# Patient Record
Sex: FEMALE | Race: BLACK OR AFRICAN AMERICAN | NOT HISPANIC OR LATINO | Employment: FULL TIME | URBAN - METROPOLITAN AREA
[De-identification: names, ages, dates, MRNs, and addresses within clinical notes are randomized per-mention and may not be internally consistent; named-entity substitution may affect disease eponyms.]

---

## 2021-11-29 ENCOUNTER — OFFICE VISIT (OUTPATIENT)
Dept: URGENT CARE | Facility: CLINIC | Age: 31
End: 2021-11-29
Payer: COMMERCIAL

## 2021-11-29 VITALS
HEIGHT: 64 IN | HEART RATE: 103 BPM | BODY MASS INDEX: 39.95 KG/M2 | SYSTOLIC BLOOD PRESSURE: 134 MMHG | TEMPERATURE: 98 F | DIASTOLIC BLOOD PRESSURE: 93 MMHG | RESPIRATION RATE: 16 BRPM | WEIGHT: 234 LBS | OXYGEN SATURATION: 99 %

## 2021-11-29 DIAGNOSIS — R30.0 DYSURIA: ICD-10-CM

## 2021-11-29 DIAGNOSIS — R68.83 CHILLS: ICD-10-CM

## 2021-11-29 DIAGNOSIS — J10.1 INFLUENZA A: Primary | ICD-10-CM

## 2021-11-29 LAB
B-HCG UR QL: NEGATIVE
BILIRUB UR QL STRIP: NEGATIVE
CTP QC/QA: YES
GLUCOSE UR QL STRIP: NEGATIVE
KETONES UR QL STRIP: NEGATIVE
LEUKOCYTE ESTERASE UR QL STRIP: NEGATIVE
PH, POC UA: 6 (ref 5–8)
POC BLOOD, URINE: NEGATIVE
POC MOLECULAR INFLUENZA A AGN: POSITIVE
POC MOLECULAR INFLUENZA B AGN: NEGATIVE
POC NITRATES, URINE: NEGATIVE
PROT UR QL STRIP: NEGATIVE
SARS-COV-2 RDRP RESP QL NAA+PROBE: NEGATIVE
SP GR UR STRIP: 1.02 (ref 1–1.03)
UROBILINOGEN UR STRIP-ACNC: NORMAL (ref 0.1–1.1)

## 2021-11-29 PROCEDURE — 81025 POCT URINE PREGNANCY: ICD-10-PCS | Mod: S$GLB,,, | Performed by: PHYSICIAN ASSISTANT

## 2021-11-29 PROCEDURE — 99203 PR OFFICE/OUTPT VISIT, NEW, LEVL III, 30-44 MIN: ICD-10-PCS | Mod: 25,S$GLB,CS, | Performed by: PHYSICIAN ASSISTANT

## 2021-11-29 PROCEDURE — 81003 URINALYSIS AUTO W/O SCOPE: CPT | Mod: QW,S$GLB,, | Performed by: PHYSICIAN ASSISTANT

## 2021-11-29 PROCEDURE — 87502 POCT INFLUENZA A/B MOLECULAR: ICD-10-PCS | Mod: QW,S$GLB,, | Performed by: PHYSICIAN ASSISTANT

## 2021-11-29 PROCEDURE — 81025 URINE PREGNANCY TEST: CPT | Mod: S$GLB,,, | Performed by: PHYSICIAN ASSISTANT

## 2021-11-29 PROCEDURE — 87086 URINE CULTURE/COLONY COUNT: CPT | Performed by: PHYSICIAN ASSISTANT

## 2021-11-29 PROCEDURE — 99203 OFFICE O/P NEW LOW 30 MIN: CPT | Mod: 25,S$GLB,CS, | Performed by: PHYSICIAN ASSISTANT

## 2021-11-29 PROCEDURE — U0002 COVID-19 LAB TEST NON-CDC: HCPCS | Mod: QW,S$GLB,, | Performed by: PHYSICIAN ASSISTANT

## 2021-11-29 PROCEDURE — U0002: ICD-10-PCS | Mod: QW,S$GLB,, | Performed by: PHYSICIAN ASSISTANT

## 2021-11-29 PROCEDURE — 81003 POCT URINALYSIS, DIPSTICK, AUTOMATED, W/O SCOPE: ICD-10-PCS | Mod: QW,S$GLB,, | Performed by: PHYSICIAN ASSISTANT

## 2021-11-29 PROCEDURE — 87502 INFLUENZA DNA AMP PROBE: CPT | Mod: QW,S$GLB,, | Performed by: PHYSICIAN ASSISTANT

## 2021-11-30 LAB
BACTERIA UR CULT: NORMAL
BACTERIA UR CULT: NORMAL

## 2021-12-01 ENCOUNTER — TELEPHONE (OUTPATIENT)
Dept: URGENT CARE | Facility: CLINIC | Age: 31
End: 2021-12-01
Payer: COMMERCIAL

## 2022-05-02 ENCOUNTER — OFFICE VISIT (OUTPATIENT)
Dept: INTERNAL MEDICINE | Facility: CLINIC | Age: 32
End: 2022-05-02
Payer: COMMERCIAL

## 2022-05-02 DIAGNOSIS — U07.1 COVID-19: Primary | ICD-10-CM

## 2022-05-02 PROCEDURE — 1160F RVW MEDS BY RX/DR IN RCRD: CPT | Mod: CPTII,95,, | Performed by: INTERNAL MEDICINE

## 2022-05-02 PROCEDURE — 1160F PR REVIEW ALL MEDS BY PRESCRIBER/CLIN PHARMACIST DOCUMENTED: ICD-10-PCS | Mod: CPTII,95,, | Performed by: INTERNAL MEDICINE

## 2022-05-02 PROCEDURE — 99203 PR OFFICE/OUTPT VISIT, NEW, LEVL III, 30-44 MIN: ICD-10-PCS | Mod: 95,,, | Performed by: INTERNAL MEDICINE

## 2022-05-02 PROCEDURE — 1159F MED LIST DOCD IN RCRD: CPT | Mod: CPTII,95,, | Performed by: INTERNAL MEDICINE

## 2022-05-02 PROCEDURE — 1159F PR MEDICATION LIST DOCUMENTED IN MEDICAL RECORD: ICD-10-PCS | Mod: CPTII,95,, | Performed by: INTERNAL MEDICINE

## 2022-05-02 PROCEDURE — 99203 OFFICE O/P NEW LOW 30 MIN: CPT | Mod: 95,,, | Performed by: INTERNAL MEDICINE

## 2022-05-02 RX ORDER — FLUTICASONE PROPIONATE 50 MCG
1 SPRAY, SUSPENSION (ML) NASAL DAILY
Qty: 16 G | Refills: 0 | Status: SHIPPED | OUTPATIENT
Start: 2022-05-02 | End: 2022-06-28

## 2022-05-02 NOTE — PROGRESS NOTES
The patient location is:  [place]  The chief complaint leading to consultation is:  COVID-19 infection    Visit type: audiovisual    Face to Face time with patient:  13   minutes of total time spent on the encounter, which includes face to face time and non-face to face time preparing to see the patient (eg, review of tests), Obtaining and/or reviewing separately obtained history, Documenting clinical information in the electronic or other health record, Independently interpreting results (not separately reported) and communicating results to the patient/family/caregiver, or Care coordination (not separately reported).         Each patient to whom he or she provides medical services by telemedicine is:  (1) informed of the relationship between the physician and patient and the respective role of any other health care provider with respect to management of the patient; and (2) notified that he or she may decline to receive medical services by telemedicine and may withdraw from such care at any time.    Notes:   CC:  COVID-19 infection  HPI:  The patient is a 32 y.o. year old female who presents for COVID-19 infection.  Her symptoms started last Saturday.  She complains of body aches, then developed congestion.  Her symptoms worsened yesterday.  She is vaccinated and has had a booster.  She denies any fever, but reports occasional nonproductive cough and clear nasal discharge.  She denies any wheezing or shortness of breath.  The patient has taken DayQuil and NyQuil.      PAST MEDICAL HISTORY:  No past medical history on file.    SURGICAL HISTORY:  No past surgical history on file.    MEDS:  Medcard reviewed and updated    ALLERGIES: Allergy Card reviewed and updated    SOCIAL HISTORY:   The patient is a nonsmoker.    PE:   APPEARANCE: Well nourished, well developed, in no acute distress.    Video visit.  PSYCHIATRIC: The patient is oriented to person, place, and time and has a pleasant affect.         ASSESSMENT/PLAN:  Diagnoses and all orders for this visit:    COVID-19  -     patient declines paxlovid  -     recommend symptomatic treatment    Other orders  -     Discontinue: fluticasone propionate (FLONASE) 50 mcg/actuation nasal spray; 1 spray (50 mcg total) by Each Nostril route once daily.              Answers for HPI/ROS submitted by the patient on 5/2/2022  Chronicity: new  Onset: yesterday  Progression since onset: waxing and waning  Pain worse on: neither  Fever: no fever  Pain - numeric: 6/10  abdominal pain: No  congestion: Yes  cough: Yes  diarrhea: No  drooling: No  ear discharge: No  ear pain: No  headaches: Yes  hoarse voice: Yes  neck pain: No  plugged ear sensation: Yes  shortness of breath: No  stridor: No  swollen glands: No  trouble swallowing: No  vomiting: No  strep: No  mono: No  Treatments tried: acetaminophen  Improvement on treatment: mild  Pain severity: mild

## 2023-05-16 ENCOUNTER — OFFICE VISIT (OUTPATIENT)
Dept: URGENT CARE | Facility: CLINIC | Age: 33
End: 2023-05-16
Payer: COMMERCIAL

## 2023-05-16 VITALS
WEIGHT: 234 LBS | SYSTOLIC BLOOD PRESSURE: 157 MMHG | BODY MASS INDEX: 39.95 KG/M2 | RESPIRATION RATE: 18 BRPM | TEMPERATURE: 100 F | HEART RATE: 99 BPM | OXYGEN SATURATION: 99 % | DIASTOLIC BLOOD PRESSURE: 92 MMHG | HEIGHT: 64 IN

## 2023-05-16 DIAGNOSIS — R42 DIZZINESS: ICD-10-CM

## 2023-05-16 DIAGNOSIS — R60.9 MUCOSAL EDEMA: ICD-10-CM

## 2023-05-16 DIAGNOSIS — B96.89 BACTERIAL SINUSITIS: Primary | ICD-10-CM

## 2023-05-16 DIAGNOSIS — J32.9 BACTERIAL SINUSITIS: Primary | ICD-10-CM

## 2023-05-16 LAB
CTP QC/QA: YES
SARS-COV-2 AG RESP QL IA.RAPID: NEGATIVE

## 2023-05-16 PROCEDURE — 87811 SARS CORONAVIRUS 2 ANTIGEN POCT, MANUAL READ: ICD-10-PCS | Mod: QW,S$GLB,, | Performed by: NURSE PRACTITIONER

## 2023-05-16 PROCEDURE — 99213 OFFICE O/P EST LOW 20 MIN: CPT | Mod: 25,S$GLB,, | Performed by: NURSE PRACTITIONER

## 2023-05-16 PROCEDURE — 96372 PR INJECTION,THERAP/PROPH/DIAG2ST, IM OR SUBCUT: ICD-10-PCS | Mod: S$GLB,,, | Performed by: NURSE PRACTITIONER

## 2023-05-16 PROCEDURE — 99213 PR OFFICE/OUTPT VISIT, EST, LEVL III, 20-29 MIN: ICD-10-PCS | Mod: 25,S$GLB,, | Performed by: NURSE PRACTITIONER

## 2023-05-16 PROCEDURE — 96372 THER/PROPH/DIAG INJ SC/IM: CPT | Mod: S$GLB,,, | Performed by: NURSE PRACTITIONER

## 2023-05-16 PROCEDURE — 87811 SARS-COV-2 COVID19 W/OPTIC: CPT | Mod: QW,S$GLB,, | Performed by: NURSE PRACTITIONER

## 2023-05-16 RX ORDER — PANTOPRAZOLE SODIUM 40 MG/1
TABLET, DELAYED RELEASE ORAL
COMMUNITY
Start: 2022-07-16

## 2023-05-16 RX ORDER — MECLIZINE HCL 12.5 MG 12.5 MG/1
12.5 TABLET ORAL EVERY 8 HOURS PRN
Qty: 30 TABLET | Refills: 0 | Status: SHIPPED | OUTPATIENT
Start: 2023-05-16

## 2023-05-16 RX ORDER — DEXAMETHASONE SODIUM PHOSPHATE 100 MG/10ML
10 INJECTION INTRAMUSCULAR; INTRAVENOUS
Status: COMPLETED | OUTPATIENT
Start: 2023-05-16 | End: 2023-05-16

## 2023-05-16 RX ORDER — IBUPROFEN 800 MG/1
800 TABLET ORAL EVERY 6 HOURS PRN
Qty: 30 TABLET | Refills: 0 | Status: SHIPPED | OUTPATIENT
Start: 2023-05-16

## 2023-05-16 RX ORDER — CEFDINIR 300 MG/1
300 CAPSULE ORAL 2 TIMES DAILY
Qty: 20 CAPSULE | Refills: 0 | Status: SHIPPED | OUTPATIENT
Start: 2023-05-16 | End: 2023-05-26

## 2023-05-16 RX ADMIN — DEXAMETHASONE SODIUM PHOSPHATE 10 MG: 100 INJECTION INTRAMUSCULAR; INTRAVENOUS at 04:05

## 2023-05-16 NOTE — PROGRESS NOTES
"Subjective:      Patient ID: Puneet Gutiérrez is a 33 y.o. female.    Vitals:  height is 5' 4" (1.626 m) and weight is 106.1 kg (234 lb). Her oral temperature is 99.5 °F (37.5 °C). Her blood pressure is 157/92 (abnormal) and her pulse is 99. Her respiration is 18 and oxygen saturation is 99%.     Chief Complaint: DIO Jeffery is a 33 y.o. female who presents today with a chief complaint of sinus pressure and congestion, ear congestion, dizziness, sore throat, fuzzy-headed feeling, and head pressure. Symptoms started 11 days ago, and after a flight-which she realizes could be contributing to the ear pressure. But, at this time she also feels ill. Denies fever, chest pain, palpitations, sob, wheeze, n/v/d.         Sore Throat   The current episode started 1 to 4 weeks ago. Associated symptoms include congestion, diarrhea, ear pain and headaches. Pertinent negatives include no abdominal pain, coughing or shortness of breath.     Constitution: Positive for fatigue. Negative for fever.   HENT:  Positive for ear pain, congestion and sore throat.    Respiratory:  Negative for chest tightness, cough, shortness of breath and wheezing.    Gastrointestinal:  Positive for diarrhea. Negative for abdominal pain.   Neurological:  Positive for headaches.    Objective:     Physical Exam   Constitutional: She is oriented to person, place, and time. She appears well-developed. She is cooperative.  Non-toxic appearance. She does not appear ill. No distress.   HENT:   Head: Normocephalic.   Ears:   Right Ear: Hearing, tympanic membrane, external ear and ear canal normal.   Left Ear: Hearing, tympanic membrane, external ear and ear canal normal.   Nose: Congestion present. No mucosal edema, rhinorrhea or nasal deformity. No epistaxis. Right sinus exhibits no maxillary sinus tenderness and no frontal sinus tenderness. Left sinus exhibits no maxillary sinus tenderness and no frontal sinus tenderness.   Mouth/Throat: Uvula is midline, " oropharynx is clear and moist and mucous membranes are normal. Mucous membranes are moist. No trismus in the jaw. Normal dentition. No uvula swelling. No oropharyngeal exudate, posterior oropharyngeal edema or posterior oropharyngeal erythema. Oropharynx is clear.   Eyes: Lids are normal. Right eye exhibits no discharge. Left eye exhibits no discharge. No scleral icterus.   Neck: Trachea normal and phonation normal. Neck supple. No edema present. No erythema present. No neck rigidity present.   Cardiovascular: Normal rate, regular rhythm and normal heart sounds.   Pulmonary/Chest: Effort normal and breath sounds normal. No respiratory distress. She has no decreased breath sounds. She has no rhonchi.   Abdominal: Normal appearance. She exhibits no distension. Soft. There is no abdominal tenderness.   Musculoskeletal: Normal range of motion.         General: No deformity. Normal range of motion.   Neurological: She is alert and oriented to person, place, and time. She exhibits normal muscle tone. Coordination normal.   Skin: Skin is warm, dry, intact, not diaphoretic and not pale.   Psychiatric: Her speech is normal and behavior is normal. Judgment and thought content normal.   Nursing note and vitals reviewed.  Results for orders placed or performed in visit on 05/16/23   SARS Coronavirus 2 Antigen, POCT Manual Read   Result Value Ref Range    SARS Coronavirus 2 Antigen Negative Negative     Acceptable Yes       Assessment:     1. Bacterial sinusitis    2. Dizziness    3. Mucosal edema        Plan:       Bacterial sinusitis  -     SARS Coronavirus 2 Antigen, POCT Manual Read  -     cefdinir (OMNICEF) 300 MG capsule; Take 1 capsule (300 mg total) by mouth 2 (two) times daily. for 10 days  Dispense: 20 capsule; Refill: 0  -     ibuprofen (ADVIL,MOTRIN) 800 MG tablet; Take 1 tablet (800 mg total) by mouth every 6 (six) hours as needed for Pain or Temperature greater than.  Dispense: 30 tablet; Refill:  0    Dizziness  -     SARS Coronavirus 2 Antigen, POCT Manual Read  -     meclizine (ANTIVERT) 12.5 mg tablet; Take 1 tablet (12.5 mg total) by mouth every 8 (eight) hours as needed for Dizziness.  Dispense: 30 tablet; Refill: 0    Mucosal edema  -     SARS Coronavirus 2 Antigen, POCT Manual Read  -     dexAMETHasone injection 10 mg      Patient Instructions   Oral fluids  Rest  Steam (hot showers, hot tea)  Blow nose often  Avoid circulating air (such as ceiling fans) dries your airway  Avoid drinking cold drinks (worsens cough)  Therapeutic coughing to expel mucous  Sit in upright position often

## 2023-06-20 RX ORDER — FLUTICASONE PROPIONATE 50 MCG
SPRAY, SUSPENSION (ML) NASAL
Qty: 16 G | Refills: 0 | Status: SHIPPED | OUTPATIENT
Start: 2023-06-20

## 2023-06-20 NOTE — TELEPHONE ENCOUNTER
Refill Routing Note   Medication(s) are not appropriate for processing by [facility] for the following reason(s):      Responsible provider unclear    [facility] action(s):  Defer None identified     Medication Therapy Plan: NO PCP LISTED ON PROFILE; DEFERRED TO LAST PRESCRIBER OF MEDICATION      Appointments  past 12m or future 3m with PCP    Date Provider   Last Visit   5/2/2022 Erika Alcazar MD   Next Visit   Visit date not found Erika Alcazar MD   ED visits in past 90 days: 0        Note composed:8:08 AM 06/20/2023

## 2023-08-18 RX ORDER — FLUTICASONE PROPIONATE 50 MCG
SPRAY, SUSPENSION (ML) NASAL
Qty: 16 G | Refills: 0 | OUTPATIENT
Start: 2023-08-18

## 2023-08-18 NOTE — TELEPHONE ENCOUNTER
Refill Routing Note   Medication(s) are not appropriate for processing by [facility] for the following reason(s):      Responsible provider unclear    [facility] action(s):  Defer Care Due:  None identified            Appointments  past 12m or future 3m with PCP    Date Provider   Last Visit   5/2/2022 Erika Alcazar MD   Next Visit   Visit date not found Erika Alcazar MD   ED visits in past 90 days: 0        Note composed:11:47 AM 08/18/2023

## 2024-08-18 ENCOUNTER — OFFICE VISIT (OUTPATIENT)
Dept: URGENT CARE | Facility: CLINIC | Age: 34
End: 2024-08-18
Payer: COMMERCIAL

## 2024-08-18 VITALS
RESPIRATION RATE: 16 BRPM | HEIGHT: 64 IN | BODY MASS INDEX: 39.95 KG/M2 | DIASTOLIC BLOOD PRESSURE: 85 MMHG | SYSTOLIC BLOOD PRESSURE: 130 MMHG | WEIGHT: 234 LBS | HEART RATE: 91 BPM | TEMPERATURE: 99 F | OXYGEN SATURATION: 96 %

## 2024-08-18 DIAGNOSIS — R30.0 DYSURIA: Primary | ICD-10-CM

## 2024-08-18 DIAGNOSIS — R10.9 RIGHT FLANK PAIN: ICD-10-CM

## 2024-08-18 LAB
BILIRUBIN, UA POC OHS: NEGATIVE
BLOOD, UA POC OHS: ABNORMAL
CLARITY, UA POC OHS: CLEAR
COLOR, UA POC OHS: YELLOW
GLUCOSE, UA POC OHS: NEGATIVE
KETONES, UA POC OHS: NEGATIVE
LEUKOCYTES, UA POC OHS: ABNORMAL
NITRITE, UA POC OHS: NEGATIVE
PH, UA POC OHS: 7
PROTEIN, UA POC OHS: 30
SPECIFIC GRAVITY, UA POC OHS: 1.02
UROBILINOGEN, UA POC OHS: 2

## 2024-08-18 PROCEDURE — 99213 OFFICE O/P EST LOW 20 MIN: CPT | Mod: 25,S$GLB,, | Performed by: FAMILY MEDICINE

## 2024-08-18 PROCEDURE — 87086 URINE CULTURE/COLONY COUNT: CPT | Performed by: FAMILY MEDICINE

## 2024-08-18 PROCEDURE — 81003 URINALYSIS AUTO W/O SCOPE: CPT | Mod: QW,S$GLB,, | Performed by: FAMILY MEDICINE

## 2024-08-18 PROCEDURE — 96372 THER/PROPH/DIAG INJ SC/IM: CPT | Mod: S$GLB,,, | Performed by: FAMILY MEDICINE

## 2024-08-18 RX ORDER — KETOROLAC TROMETHAMINE 30 MG/ML
30 INJECTION, SOLUTION INTRAMUSCULAR; INTRAVENOUS
Status: COMPLETED | OUTPATIENT
Start: 2024-08-18 | End: 2024-08-18

## 2024-08-18 RX ORDER — AMLODIPINE AND VALSARTAN 5; 160 MG/1; MG/1
1 TABLET ORAL DAILY
COMMUNITY
Start: 2024-06-24 | End: 2025-06-24

## 2024-08-18 RX ORDER — CIPROFLOXACIN 500 MG/1
500 TABLET ORAL 2 TIMES DAILY
Qty: 14 TABLET | Refills: 0 | Status: SHIPPED | OUTPATIENT
Start: 2024-08-18 | End: 2024-08-25

## 2024-08-18 RX ORDER — TRAZODONE HYDROCHLORIDE 50 MG/1
1 TABLET ORAL NIGHTLY
COMMUNITY
Start: 2024-06-24 | End: 2025-06-24

## 2024-08-18 RX ORDER — NORETHINDRONE 5 MG/1
1 TABLET ORAL DAILY
COMMUNITY
Start: 2024-03-19 | End: 2025-03-19

## 2024-08-18 RX ORDER — IBUPROFEN 600 MG/1
600 TABLET ORAL EVERY 8 HOURS PRN
Qty: 30 TABLET | Refills: 0 | Status: SHIPPED | OUTPATIENT
Start: 2024-08-18

## 2024-08-18 RX ADMIN — KETOROLAC TROMETHAMINE 30 MG: 30 INJECTION, SOLUTION INTRAMUSCULAR; INTRAVENOUS at 10:08

## 2024-08-18 NOTE — PROGRESS NOTES
"Subjective:      Patient ID: Puneet Gutiérrez is a 34 y.o. female.    Vitals:  height is 5' 4" (1.626 m) and weight is 106.1 kg (234 lb). Her temperature is 98.5 °F (36.9 °C). Her blood pressure is 130/85 and her pulse is 91. Her respiration is 16 and oxygen saturation is 96%.     Chief Complaint: Back Pain    This is a 34 y.o. female who presents today with a chief complaint of dysuria onset 1 1/2 weeks. Pt states she is also having spotting of blood and feels like a pulled muscle in back.. Pt suspects kidney stones, has a PMHx of kidney stones 2x. Pt took ibruprufen, provided relief.    Dysuria   This is a new problem. The current episode started 1 to 4 weeks ago. The problem occurs intermittently. The problem has been unchanged. The quality of the pain is described as burning, shooting and aching. The pain is at a severity of 4/10. The pain is moderate. There has been no fever. She is Not sexually active (not sexually active in last 2 weeks). Associated symptoms include frequency and hematuria. Pertinent negatives include no discharge, nausea, possible pregnancy or constipation. She has tried NSAIDs for the symptoms. The treatment provided moderate relief. Her past medical history is significant for hypertension and kidney stones. There is no history of recurrent UTIs.     Gastrointestinal:  Negative for nausea and constipation.   Genitourinary:  Positive for dysuria, frequency and hematuria.      Objective:     Physical Exam   Constitutional: She does not appear ill. No distress. obesity  HENT:   Head: Normocephalic and atraumatic.   Mouth/Throat: Mucous membranes are moist.   Cardiovascular: Normal rate, regular rhythm, normal heart sounds and normal pulses.   Pulmonary/Chest: Effort normal and breath sounds normal.   Abdominal: Normal appearance and bowel sounds are normal. Soft. There is no left CVA tenderness and no right CVA tenderness.   Neurological: She is alert.   Nursing note and vitals " reviewed.  Results for orders placed or performed in visit on 08/18/24   POCT Urinalysis(Instrument)   Result Value Ref Range    Color, POC UA Yellow Yellow, Straw, Colorless    Clarity, POC UA Clear Clear    Glucose, POC UA Negative Negative    Bilirubin, POC UA Negative Negative    Ketones, POC UA Negative Negative    Spec Grav POC UA 1.025 1.005 - 1.030    Blood, POC UA Small (A) Negative    pH, POC UA 7.0 5.0 - 8.0    Protein, POC UA 30 (A) Negative    Urobilinogen, POC UA 2.0 (A) <=1.0    Nitrite, POC UA Negative Negative    WBC, POC UA Large (A) Negative      Assessment:     1. Dysuria    2. Right flank pain      Reviewed chart material - last imaging revealed probable passage of renal stone. Symptoms are consistent with possible UTI/early pyelonephritis. Will treats as UTI pending culture results  Plan:       Dysuria  -     POCT Urinalysis(Instrument)  -     CULTURE, URINE  -     ciprofloxacin HCl (CIPRO) 500 MG tablet; Take 1 tablet (500 mg total) by mouth 2 (two) times daily. for 7 days  Dispense: 14 tablet; Refill: 0    Right flank pain  -     ketorolac injection 30 mg  -     ibuprofen (ADVIL,MOTRIN) 600 MG tablet; Take 1 tablet (600 mg total) by mouth every 8 (eight) hours as needed.  Dispense: 30 tablet; Refill: 0    RTC prn worsening symptoms

## 2024-08-19 LAB
BACTERIA UR CULT: NORMAL
BACTERIA UR CULT: NORMAL

## 2024-10-28 ENCOUNTER — PATIENT MESSAGE (OUTPATIENT)
Dept: URGENT CARE | Facility: CLINIC | Age: 34
End: 2024-10-28

## 2024-10-28 ENCOUNTER — OFFICE VISIT (OUTPATIENT)
Dept: URGENT CARE | Facility: CLINIC | Age: 34
End: 2024-10-28
Payer: COMMERCIAL

## 2024-10-28 ENCOUNTER — HOSPITAL ENCOUNTER (OUTPATIENT)
Dept: RADIOLOGY | Facility: HOSPITAL | Age: 34
Discharge: HOME | End: 2024-10-28
Attending: NURSE PRACTITIONER
Payer: COMMERCIAL

## 2024-10-28 VITALS
OXYGEN SATURATION: 99 % | SYSTOLIC BLOOD PRESSURE: 127 MMHG | TEMPERATURE: 99 F | HEIGHT: 64 IN | RESPIRATION RATE: 16 BRPM | BODY MASS INDEX: 37.9 KG/M2 | DIASTOLIC BLOOD PRESSURE: 83 MMHG | WEIGHT: 222 LBS | HEART RATE: 88 BPM

## 2024-10-28 DIAGNOSIS — S93.402A SPRAIN OF LEFT ANKLE, UNSPECIFIED LIGAMENT, INITIAL ENCOUNTER: Primary | ICD-10-CM

## 2024-10-28 DIAGNOSIS — M25.572 ACUTE LEFT ANKLE PAIN: ICD-10-CM

## 2024-10-28 PROCEDURE — 73610 X-RAY EXAM OF ANKLE: CPT | Mod: 26,LT,, | Performed by: RADIOLOGY

## 2024-10-28 PROCEDURE — 73610 X-RAY EXAM OF ANKLE: CPT | Mod: TC,LT

## 2024-10-28 PROCEDURE — 99213 OFFICE O/P EST LOW 20 MIN: CPT | Mod: S$GLB,,, | Performed by: NURSE PRACTITIONER

## 2024-10-28 RX ORDER — MELOXICAM 7.5 MG/1
7.5 TABLET ORAL DAILY
Qty: 14 TABLET | Refills: 0 | Status: SHIPPED | OUTPATIENT
Start: 2024-10-28 | End: 2024-11-11

## 2024-11-13 ENCOUNTER — OFFICE VISIT (OUTPATIENT)
Dept: PODIATRY | Facility: CLINIC | Age: 34
End: 2024-11-13
Payer: COMMERCIAL

## 2024-11-13 VITALS
DIASTOLIC BLOOD PRESSURE: 81 MMHG | WEIGHT: 222 LBS | SYSTOLIC BLOOD PRESSURE: 133 MMHG | BODY MASS INDEX: 37.9 KG/M2 | HEIGHT: 64 IN | HEART RATE: 92 BPM

## 2024-11-13 DIAGNOSIS — M25.572 LEFT ANKLE PAIN, UNSPECIFIED CHRONICITY: Primary | ICD-10-CM

## 2024-11-13 DIAGNOSIS — S96.912A ANKLE STRAIN, LEFT, INITIAL ENCOUNTER: ICD-10-CM

## 2024-11-13 PROCEDURE — 3079F DIAST BP 80-89 MM HG: CPT | Mod: CPTII,S$GLB,, | Performed by: PODIATRIST

## 2024-11-13 PROCEDURE — 4010F ACE/ARB THERAPY RXD/TAKEN: CPT | Mod: CPTII,S$GLB,, | Performed by: PODIATRIST

## 2024-11-13 PROCEDURE — 1159F MED LIST DOCD IN RCRD: CPT | Mod: CPTII,S$GLB,, | Performed by: PODIATRIST

## 2024-11-13 PROCEDURE — 3075F SYST BP GE 130 - 139MM HG: CPT | Mod: CPTII,S$GLB,, | Performed by: PODIATRIST

## 2024-11-13 PROCEDURE — 99999 PR PBB SHADOW E&M-EST. PATIENT-LVL IV: CPT | Mod: PBBFAC,,, | Performed by: PODIATRIST

## 2024-11-13 PROCEDURE — 99203 OFFICE O/P NEW LOW 30 MIN: CPT | Mod: S$GLB,,, | Performed by: PODIATRIST

## 2024-11-13 PROCEDURE — 3008F BODY MASS INDEX DOCD: CPT | Mod: CPTII,S$GLB,, | Performed by: PODIATRIST

## 2024-11-13 PROCEDURE — 1160F RVW MEDS BY RX/DR IN RCRD: CPT | Mod: CPTII,S$GLB,, | Performed by: PODIATRIST

## 2024-11-13 RX ORDER — METHYLPREDNISOLONE 4 MG/1
TABLET ORAL
Qty: 21 TABLET | Refills: 0 | Status: SHIPPED | OUTPATIENT
Start: 2024-11-13

## 2024-11-13 NOTE — PATIENT INSTRUCTIONS
Over the counter pain creams: Voltaren Gel, Biofreeze, Bengay, tiger balm, two old goat, lidocaine gel,  Absorbine Veterinary Liniment Gel Topical Analgesic Sore Muscle and Joint Pain Relief    Recommend lotions: eucerin, eucerin for diabetics, aquaphor, A&D ointment, gold bond for diabetics, sween, [place]'s Bees all purpose baby ointment,  urea 40 with aloe or SkinIntegra rapid crack repair (found on amazon.com)    Shoe recommendations: (try 6pm.com, zappos.com , nordstromrack.Mingly, or shoes.com for discounted prices) you can visit varsity shoes in [place], DSW shoes in [place]  or carrillo rack in the [facility] (there are also several shoe brand outlets in the [facility])    ONLY purchase stability style tennis shoes AVOID flex, foam, free, yoga mat style shoes or shoes that claim to feel like clouds  If you have a flatter foot purchase shoes for PRONATION  If you have a high arch purchase shoes for SUPINATION    Shoe examples:    Asics (GT or gel foundations, gel-kayano-30), new balance stability type shoes (such as the 940 series or the [id number]), edwar (Guide 16, stabil c3),  Garnett (GTS or Beast or   transcend), propet, HokaOne (mustapha 7, arahi, blank) Simón (tennis shoes and boots)    Sofft Brand (women) Hugo&Alvin (men), clarks, crocs, aerosoles, naturalizers, SAS, ecco, born, deya herman, rockports (dress shoes)    Vionic, burkenstocks, fitflops, propet, taos, baretraps, Hoka or vionic recovery slides/sandals (sandals)    Hoka or vionic recovery slides/sandals, crocs, propet (house shoes)    Understanding Ankle Sprain    The ankle is the joint where the leg and foot meet. Bones are held in place by connective tissue called ligaments. When ankle ligaments are stretched to the point of pain and injury, it is called an ankle sprain. A sprain can tear the ligaments. These tears can be very small but still cause pain. Ankle sprains can be mild or severe.  What causes an ankle sprain?  A sprain may  occur when you twist your ankle or bend it too far. This can happen when you stumble or fall. Things that can make an ankle sprain more likely include:  Having had an ankle sprain before  Playing sports that involve running and jumping. Or playing contact sports such as football or hockey.  Wearing shoes that dont support your feet and ankles well  Having ankles with poor strength and flexibility  Symptoms of an ankle sprain  Symptoms may include:  Pain or soreness in the ankle  Swelling  Redness or bruising  Not being able to walk or put weight on the affected foot  Reduced range of motion in the ankle  A popping or tearing feeling at the time the sprain occurs  An abnormal or dislocated look to the ankle  Instability or too much range of motion in the ankle  Treatment for an ankle sprain  Treatment focuses on reducing pain and swelling, and avoiding further injury. Treatments may include:  Resting the ankle. Avoid putting weight on it. This may mean using crutches until the sprain heals.  Prescription or over-the-counter pain medicines. These help reduce swelling and pain.  Cold packs. These help reduce pain and swelling.  Raising your ankle above your heart. This helps reduce swelling.  Wrapping the ankle with an elastic bandage or ankle brace. This helps reduce swelling and gives some support to the ankle. In rare cases, you may need a cast or boot.  Stretching and other exercises. These improve flexibility and strength.  Heat packs. These may be recommended before doing ankle exercises.  Possible complications of an ankle sprain  An ankle that has been weakened by a sprain can be more likely to have repeated sprains afterward. Doing exercises to strengthen your ankle and improve balance can reduce your risk for repeated sprains. Other possible complications are long-term (chronic) pain or an ankle that remains unstable.  When to call your healthcare provider  Call your healthcare provider right away if you  have any of these:  Fever of 100.4°F (38°C) or higher, or as directed  Pain, numbness, discoloration, or coldness in the foot or toes  Pain that gets worse  Symptoms that dont get better, or get worse  New symptoms   Date Last Reviewed: 3/10/2016  © 4353-1960 [employer]. [address] [place] [address], [place] [address]. All rights reserved. This information is not intended as a substitute for professional medical care. Always follow your healthcare professional's instructions.        Treating Ankle Sprains  Treatment will depend on how bad your sprain is. For a severe sprain, healing may take 3 months or more.  Right after your injury: Use R.I.C.E.  Rest: At first, keep weight off the ankle as much as you can. You may be given crutches to help you walk without putting weight on the ankle.  Ice: Put an ice pack on the ankle for 15 minutes. Remove the pack and wait at least 30 minutes. Repeat for up to 3 days. This helps reduce swelling.  Compression: To reduce swelling and keep the joint stable, you may need to wrap the ankle with an elastic bandage. For more severe sprains, you may need an ankle brace or a cast.  Elevation: To reduce swelling, keep your ankle raised above your heart when you sit or lie down.  Medicine  Your healthcare provider may suggest oral non-steroidal anti-inflammatory medicine (NSAIDs), such as ibuprofen. This relieves the pain and helps reduce any swelling. Be sure to take your medicine as directed.  Contrast baths  After 3 days, soak your ankle in warm water for 30 seconds, then in cool water for 30 seconds. Go back and forth for 5 minutes. Doing this every 2 hours will help keep the swelling down.  Exercises    After about 2 to 3 weeks, you may be given exercises to strengthen the ligaments and muscles in the ankle. Doing these exercises will help prevent another ankle sprain. Exercises may include standing on your toes and then on your heels and doing ankle curls.  Ankle  curls  Sit on the edge of a sturdy table or lie on your back.  Pull your toes toward you. Then point them away from you. Repeat for 2 to 3 minutes.   Date Last Reviewed: 9/28/2015 © 2000-2017 [employer]. [address] [place] [id number]. All rights reserved. This information is not intended as a substitute for professional medical care. Always follow your healthcare professional's instructions.          Wearing Proper Shoes                    You walk on your feet every day, forcing them to support the weight of your body. Repeated stress on your feet can cause damage over time. The right shoes can help protect your feet. The wrong shoes can cause more foot problems. Read the information below to help you find a shoe that fits your foot needs.      A good shoe fit will cover your foot outline. A shoe that doesnt cover the outline is a bad fit.   Whats your foot shape?  To get a good fit, you need to know the shape of your foot. Do this simple test: While standing, place your foot on a piece of paper and trace around it. Is your foot straight or curved? Do you have a foot problem, such as a bunion, that causes your foot outline to show a bulge on the side of your big toe?  Finding your fit  Bring your foot outline to the shoe store to help you find the right shoe. Place a shoe you like on top of the outline to see if it matches the shape. The shoe should cover the outline. (If you have a bunion, the shoe may not cover the bulge on the outline. Look for soft leather shoes to stretch over the bunion.) Once youve found a pair of proper shoes, put them on. Walk around. Be sure the shoes dont rub or pinch. If the shoes feel good, youve found your fit!  The right shoe for you  A good shoe has features that provide comfort and support. It must also be the right size and shape for your feet. Look for a shoe made of breathable fabric and lining, such as leather or canvas. Be sure that shoes have  enough tread to prevent slipping. Go to a good shoe store for help finding the right shoe.  Good shoe features  An ideal shoe has the following:  Laces for support. If tying laces is a problem for you, try shoes with Velcro fasteners or arturo.  A front of the shoe (toe box) with ½ inch space in front of your longest toes.  An arch shape that supports your foot.  No more than 1½ inches of heel.  A stiff, snug back of the shoe to keep your foot from sliding around.  A smooth lining with no rough seams.  Shoe shopping tips  Below are some dos and donts for when you go to the shoe store.  Do:  Select the shoes that feel right. Wear them around the house. Then bring them to your foot healthcare provider to check for fit. If they dont fit well, return them.  Shop late in the day, when your feet will be slightly bigger.  Each time you buy shoes, have both your feet measured while you are standing. Foot size changes with time.  Pick shoes to suit their purpose. High heels are OK for an occasional night on the town. But for everyday wear, choose a more sensible shoe.  Try on shoes while wearing any inserts specially made for your feet (orthoses).  Try on both the right and left shoes. If your feet are different sizes, pick a pair that fits the larger foot.  Dont:  Dont buy shoes based on shoe size alone. Always try on shoes, as sizes differ from brand to brand and within brands.  Dont expect shoes to break in. If they dont fit at the store, dont buy them.  Dont buy a shoe that doesnt match your foot shape.  What about socks?  Always wear socks with shoes. Socks help absorb sweat and reduce friction and blistering. When shopping for shoes, choose soft, padded socks with seams that dont irritate your feet.  If you have foot problems  Some foot problems cause deformities. This can make it hard to find a good fit. Look for shoes made of soft leather to stretch over the deformity. If you have bunions, buy shoes  with a wider toe box. To fit hammertoes, look for shoes with a tall toe box. If you have arch problems, you may need inserts. In some cases, youll need to have custom footwear or orthoses made for your feet.  Suggested footwear  Ask your healthcare provider what kind of footwear you need. He or she may recommend a certain brand or shoe store.  Date Last Reviewed: 8/1/2016  © 2989-5256 [employer]. [address] [place] [address], [place] [address]. All rights reserved. This information is not intended as a substitute for professional medical care. Always follow your healthcare professional's instructions.

## 2024-11-13 NOTE — PROGRESS NOTES
Subjective:      Patient ID: Puneet Gutiérrez is a 34 y.o. female.    Chief Complaint: Ankle Injury and Ankle Pain (left)      Puneet Gutiérrez is a 34 y.o. female who presents to the podiatry clinic  with complaint of  left foot pain, especially with palpation ambulation. Description: moderate Nature: aching, sharp, and throbbing Location:  Medial ankle. Onset of the symptoms was September 20, 2024. Precipitating event: none known.  Patient has been working out avidly since last year.  History of injury: no Current symptoms include: ability to bear weight, but with some pain, stiffness, swelling, and worsening symptoms after a period of activity. Alleviating factors: rest Symptoms have progressed to a point and plateaued.  She has been treating with rest ice elevation, 600 mg ibuprofen twice a day on occasion.  Patients rates pain 2/10 on pain scale.    Shoe gear: flexible Nikes today, wears jackson to gym      There is no problem list on file for this patient.      Current Outpatient Medications on File Prior to Visit   Medication Sig Dispense Refill    amlodipine-valsartan (EXFORGE) 5-160 mg per tablet Take 1 tablet by mouth once daily.      fluticasone propionate (FLONASE) 50 mcg/actuation nasal spray SHAKE LIQUID AND USE 1 SPRAY(50 MCG) IN EACH NOSTRIL EVERY DAY 16 g 0    ibuprofen (ADVIL,MOTRIN) 600 MG tablet Take 1 tablet (600 mg total) by mouth every 8 (eight) hours as needed. 30 tablet 0    ibuprofen (ADVIL,MOTRIN) 800 MG tablet Take 1 tablet (800 mg total) by mouth every 6 (six) hours as needed for Pain or Temperature greater than. 30 tablet 0    meclizine (ANTIVERT) 12.5 mg tablet Take 1 tablet (12.5 mg total) by mouth every 8 (eight) hours as needed for Dizziness. 30 tablet 0    norethindrone (AYGESTIN) 5 mg Tab Take 1 tablet by mouth once daily.      norethindrone-ethinyl estradiol (ORTHO-NOVUM 7/7/7, 28,) 0.5/0.75/1 mg- 35 mcg per tablet Take 1 tablet by mouth once daily.      pantoprazole (PROTONIX) 40  MG tablet       traZODone (DESYREL) 50 MG tablet Take 1 tablet by mouth every evening.       No current facility-administered medications on file prior to visit.       Review of patient's allergies indicates:   Allergen Reactions    Bleach (sodium hypochlorite)     Kale Itching     Tongue swelling    Penicillins Nausea And Vomiting and Other (See Comments)     CHILDHOOD ALLERGY       History reviewed. No pertinent surgical history.    No family history on file.    Social History     Socioeconomic History    Marital status: Single   Tobacco Use    Smoking status: Some Days     Types: Vaping with nicotine    Smokeless tobacco: Never   Substance and Sexual Activity    Alcohol use: Yes     Comment: socially     Social Drivers of Health     Financial Resource Strain: Low Risk  (9/26/2023)    Received from [facility]    Overall Financial Resource Strain (CARDIA)     Difficulty of Paying Living Expenses: Not very hard   Food Insecurity: No Food Insecurity (9/26/2023)    Received from [facility]    Hunger Vital Sign     Worried About Running Out of Food in the Last Year: Never true     Ran Out of Food in the Last Year: Never true   Transportation Needs: No Transportation Needs (9/26/2023)    Received from [facility]    PRAPARE - Transportation     Lack of Transportation (Medical): No     Lack of Transportation (Non-Medical): No   Physical Activity: Insufficiently Active (9/26/2023)    Received from [facility]    Exercise Vital Sign     Days of Exercise per Week: 3 days     Minutes of Exercise per Session: 40 min   Stress: Stress Concern Present (9/26/2023)    Received from [facility]    [employer] - Occupational Stress Questionnaire     Feeling of Stress : To some extent       Review of Systems   Constitutional: Negative for chills and fever.   Cardiovascular:  Negative for claudication and leg swelling.   Respiratory:  Negative for cough and shortness of breath.    Skin:  Negative for  "itching and rash.   Musculoskeletal:  Positive for joint pain, myalgias and stiffness. Negative for falls, joint swelling and muscle weakness.   Gastrointestinal:  Negative for diarrhea, nausea and vomiting.   Neurological:  Negative for numbness, paresthesias, tremors and weakness.   Psychiatric/Behavioral:  Negative for altered mental status and hallucinations.            Objective:      Vitals:    11/13/24 0753   BP: 133/81   Pulse: 92   Weight: 100.7 kg (222 lb 0.1 oz)   Height: 5' 4" (1.626 m)   PainSc:   2   PainLoc: Ankle       Physical Exam  Vitals and nursing note reviewed.   Constitutional:       General: She is not in acute distress.     Appearance: She is well-developed. She is not toxic-appearing or diaphoretic.      Comments: alert and oriented x 3.    Cardiovascular:      Pulses:           Dorsalis pedis pulses are 2+ on the right side and 2+ on the left side.        Posterior tibial pulses are 2+ on the right side and 2+ on the left side.      Comments:  Capillary refill time is within normal limits. Digital hair present.   Pulmonary:      Effort: No respiratory distress.   Musculoskeletal:         General: No deformity.      Right ankle: Swelling present. Tenderness (deltoid) present. No lateral malleolus, medial malleolus, AITF ligament, CF ligament, posterior TF ligament, base of 5th metatarsal or proximal fibula tenderness.      Right Achilles Tendon: No defects. Lawson's test negative.      Left ankle: No tenderness. No lateral malleolus, medial malleolus, AITF ligament, CF ligament or posterior TF ligament tenderness.      Left Achilles Tendon: No defects. Lawson's test negative.      Right foot: No tenderness or bony tenderness.      Left foot: No tenderness or bony tenderness.      Comments:  Muscle strength is 5/5 in all groups bilaterally.           Feet:      Right foot:      Protective Sensation: 5 sites tested.  5 sites sensed.      Skin integrity: Skin integrity normal.      Left " foot:      Protective Sensation: 5 sites tested.  5 sites sensed.      Skin integrity: Skin integrity normal.   Lymphadenopathy:      Comments: No lymphatic streaking     Skin:     General: Skin is warm and dry.      Coloration: Skin is not pale.      Findings: No rash.      Nails: There is no clubbing.      Comments: Skin is of normal turgor.   Normal temperature gradient.  Examination of the skin reveals no evidence of significant rashes, open lesions, suspicious appearing nevi or other concerning lesions.    Neurological:      Sensory: No sensory deficit.      Motor: No atrophy.      Comments: Light touch present     Psychiatric:         Attention and Perception: She is attentive.         Mood and Affect: Mood is not anxious. Affect is not inappropriate.         Speech: She is communicative. Speech is not slurred.         Behavior: Behavior is not combative.               Assessment:       Encounter Diagnoses   Name Primary?    Left ankle pain, unspecified chronicity Yes    Ankle strain, left, initial encounter          Plan:     Problem List Items Addressed This Visit    None  Visit Diagnoses       Left ankle pain, unspecified chronicity    -  Primary    Ankle strain, left, initial encounter                 I counseled the patient on her conditions, their implications and medical management.    Discussed ankle ligament pathology particularly and importance of immobilization for healing as well as the lengthy course of treatment to decrease potential deformity and arthralgia.    Discussed rest vs active means of treatment    I gave written and verbal instructions on stretching exercises. Patient expressed understanding. Discussed icing the affected area as needed and also wearing appropriate shoe gear and avoiding flats, slippers, sandals, and going barefoot. My recommendation for OTC supports is [employer]. Patient instructed on adequate icing techniques. Patient should ice the affected area at least  once per day x 10 minutes for 10 days . I advised the patient that extra icing would also be beneficial to ensure adequate anti inflammatory effect. We also discussed cortisone injections and NSAID therapy. Medrol prescribed. Patient was instructed on dosing information. Discontinue if adverse effects occur     RTC in 10-12 weeks if no improvement, at this time she will receive CAM boot immobilization, cortisone injections, or/ and referral to PT. Patient is amenable to plan.